# Patient Record
Sex: FEMALE | Race: WHITE | Employment: OTHER | URBAN - METROPOLITAN AREA
[De-identification: names, ages, dates, MRNs, and addresses within clinical notes are randomized per-mention and may not be internally consistent; named-entity substitution may affect disease eponyms.]

---

## 2017-10-18 ENCOUNTER — ANESTHESIA (OUTPATIENT)
Dept: ENDOSCOPY | Age: 67
End: 2017-10-18
Payer: MEDICARE

## 2017-10-18 ENCOUNTER — ANESTHESIA EVENT (OUTPATIENT)
Dept: ENDOSCOPY | Age: 67
End: 2017-10-18
Payer: MEDICARE

## 2017-10-18 ENCOUNTER — HOSPITAL ENCOUNTER (OUTPATIENT)
Age: 67
Setting detail: OUTPATIENT SURGERY
Discharge: HOME | End: 2017-10-18
Attending: SPECIALIST | Admitting: SPECIALIST
Payer: MEDICARE

## 2017-10-18 VITALS
OXYGEN SATURATION: 96 % | BODY MASS INDEX: 31.22 KG/M2 | HEART RATE: 77 BPM | WEIGHT: 194.25 LBS | RESPIRATION RATE: 15 BRPM | HEIGHT: 66 IN | SYSTOLIC BLOOD PRESSURE: 142 MMHG | DIASTOLIC BLOOD PRESSURE: 70 MMHG | TEMPERATURE: 97.8 F

## 2017-10-18 LAB
GLUCOSE BLD STRIP.AUTO-MCNC: 167 MG/DL (ref 65–100)
SERVICE CMNT-IMP: ABNORMAL

## 2017-10-18 PROCEDURE — 74011250636 HC RX REV CODE- 250/636: Performed by: SPECIALIST

## 2017-10-18 PROCEDURE — 76040000019: Performed by: SPECIALIST

## 2017-10-18 PROCEDURE — 74011250636 HC RX REV CODE- 250/636

## 2017-10-18 PROCEDURE — 77030027957 HC TBNG IRR ENDOGTR BUSS -B: Performed by: SPECIALIST

## 2017-10-18 PROCEDURE — 76060000031 HC ANESTHESIA FIRST 0.5 HR: Performed by: SPECIALIST

## 2017-10-18 PROCEDURE — 74011000250 HC RX REV CODE- 250

## 2017-10-18 PROCEDURE — 77030009426 HC FCPS BIOP ENDOSC BSC -B: Performed by: SPECIALIST

## 2017-10-18 PROCEDURE — 82962 GLUCOSE BLOOD TEST: CPT

## 2017-10-18 PROCEDURE — 77030021392 HC DEV RETRV POLYP BSC -B: Performed by: SPECIALIST

## 2017-10-18 PROCEDURE — 77030013992 HC SNR POLYP ENDOSC BSC -B: Performed by: SPECIALIST

## 2017-10-18 PROCEDURE — 88305 TISSUE EXAM BY PATHOLOGIST: CPT | Performed by: SPECIALIST

## 2017-10-18 RX ORDER — EPINEPHRINE 0.1 MG/ML
1 INJECTION INTRACARDIAC; INTRAVENOUS
Status: DISCONTINUED | OUTPATIENT
Start: 2017-10-18 | End: 2017-10-18 | Stop reason: HOSPADM

## 2017-10-18 RX ORDER — SODIUM CHLORIDE 9 MG/ML
INJECTION, SOLUTION INTRAVENOUS
Status: DISCONTINUED | OUTPATIENT
Start: 2017-10-18 | End: 2017-10-18 | Stop reason: HOSPADM

## 2017-10-18 RX ORDER — BUPROPION HYDROCHLORIDE 150 MG/1
TABLET, EXTENDED RELEASE ORAL 2 TIMES DAILY
COMMUNITY

## 2017-10-18 RX ORDER — LORAZEPAM 2 MG/ML
2 INJECTION INTRAMUSCULAR AS NEEDED
Status: DISCONTINUED | OUTPATIENT
Start: 2017-10-18 | End: 2017-10-18 | Stop reason: HOSPADM

## 2017-10-18 RX ORDER — SODIUM CHLORIDE 9 MG/ML
50 INJECTION, SOLUTION INTRAVENOUS CONTINUOUS
Status: DISCONTINUED | OUTPATIENT
Start: 2017-10-18 | End: 2017-10-18 | Stop reason: HOSPADM

## 2017-10-18 RX ORDER — CITALOPRAM 40 MG/1
40 TABLET, FILM COATED ORAL DAILY
COMMUNITY

## 2017-10-18 RX ORDER — METFORMIN HYDROCHLORIDE 1000 MG/1
1000 TABLET ORAL 2 TIMES DAILY WITH MEALS
COMMUNITY

## 2017-10-18 RX ORDER — GUAIFENESIN 100 MG/5ML
81 LIQUID (ML) ORAL DAILY
COMMUNITY
End: 2017-10-18

## 2017-10-18 RX ORDER — VALSARTAN AND HYDROCHLOROTHIAZIDE 320; 25 MG/1; MG/1
1 TABLET, FILM COATED ORAL DAILY
COMMUNITY

## 2017-10-18 RX ORDER — FENTANYL CITRATE 50 UG/ML
200 INJECTION, SOLUTION INTRAMUSCULAR; INTRAVENOUS
Status: DISCONTINUED | OUTPATIENT
Start: 2017-10-18 | End: 2017-10-18 | Stop reason: HOSPADM

## 2017-10-18 RX ORDER — NALOXONE HYDROCHLORIDE 0.4 MG/ML
0.4 INJECTION, SOLUTION INTRAMUSCULAR; INTRAVENOUS; SUBCUTANEOUS
Status: DISCONTINUED | OUTPATIENT
Start: 2017-10-18 | End: 2017-10-18 | Stop reason: HOSPADM

## 2017-10-18 RX ORDER — FLUMAZENIL 0.1 MG/ML
0.2 INJECTION INTRAVENOUS
Status: DISCONTINUED | OUTPATIENT
Start: 2017-10-18 | End: 2017-10-18 | Stop reason: HOSPADM

## 2017-10-18 RX ORDER — SODIUM CHLORIDE 0.9 % (FLUSH) 0.9 %
5-10 SYRINGE (ML) INJECTION EVERY 8 HOURS
Status: DISCONTINUED | OUTPATIENT
Start: 2017-10-18 | End: 2017-10-18 | Stop reason: HOSPADM

## 2017-10-18 RX ORDER — AMLODIPINE BESYLATE 5 MG/1
5 TABLET ORAL DAILY
COMMUNITY

## 2017-10-18 RX ORDER — GLIPIZIDE 5 MG/1
TABLET ORAL 2 TIMES DAILY
COMMUNITY

## 2017-10-18 RX ORDER — DEXTROMETHORPHAN/PSEUDOEPHED 2.5-7.5/.8
1.2 DROPS ORAL
Status: DISCONTINUED | OUTPATIENT
Start: 2017-10-18 | End: 2017-10-18 | Stop reason: HOSPADM

## 2017-10-18 RX ORDER — ATORVASTATIN CALCIUM 20 MG/1
TABLET, FILM COATED ORAL DAILY
COMMUNITY

## 2017-10-18 RX ORDER — MELOXICAM 15 MG/1
15 TABLET ORAL DAILY
COMMUNITY
End: 2017-10-18

## 2017-10-18 RX ORDER — SODIUM CHLORIDE 0.9 % (FLUSH) 0.9 %
5-10 SYRINGE (ML) INJECTION AS NEEDED
Status: DISCONTINUED | OUTPATIENT
Start: 2017-10-18 | End: 2017-10-18 | Stop reason: HOSPADM

## 2017-10-18 RX ORDER — MIDAZOLAM HYDROCHLORIDE 1 MG/ML
.25-1 INJECTION, SOLUTION INTRAMUSCULAR; INTRAVENOUS
Status: DISCONTINUED | OUTPATIENT
Start: 2017-10-18 | End: 2017-10-18 | Stop reason: HOSPADM

## 2017-10-18 RX ORDER — ATROPINE SULFATE 0.1 MG/ML
0.5 INJECTION INTRAVENOUS
Status: DISCONTINUED | OUTPATIENT
Start: 2017-10-18 | End: 2017-10-18 | Stop reason: HOSPADM

## 2017-10-18 RX ORDER — LIDOCAINE HYDROCHLORIDE 20 MG/ML
INJECTION, SOLUTION EPIDURAL; INFILTRATION; INTRACAUDAL; PERINEURAL AS NEEDED
Status: DISCONTINUED | OUTPATIENT
Start: 2017-10-18 | End: 2017-10-18 | Stop reason: HOSPADM

## 2017-10-18 RX ORDER — PROPOFOL 10 MG/ML
INJECTION, EMULSION INTRAVENOUS AS NEEDED
Status: DISCONTINUED | OUTPATIENT
Start: 2017-10-18 | End: 2017-10-18 | Stop reason: HOSPADM

## 2017-10-18 RX ADMIN — PROPOFOL 30 MG: 10 INJECTION, EMULSION INTRAVENOUS at 11:47

## 2017-10-18 RX ADMIN — LIDOCAINE HYDROCHLORIDE 40 MG: 20 INJECTION, SOLUTION EPIDURAL; INFILTRATION; INTRACAUDAL; PERINEURAL at 11:46

## 2017-10-18 RX ADMIN — PROPOFOL 50 MG: 10 INJECTION, EMULSION INTRAVENOUS at 11:49

## 2017-10-18 RX ADMIN — PROPOFOL 50 MG: 10 INJECTION, EMULSION INTRAVENOUS at 11:46

## 2017-10-18 RX ADMIN — SODIUM CHLORIDE 50 ML/HR: 900 INJECTION, SOLUTION INTRAVENOUS at 11:22

## 2017-10-18 RX ADMIN — PROPOFOL 30 MG: 10 INJECTION, EMULSION INTRAVENOUS at 11:51

## 2017-10-18 RX ADMIN — SODIUM CHLORIDE: 9 INJECTION, SOLUTION INTRAVENOUS at 11:42

## 2017-10-18 RX ADMIN — PROPOFOL 25 MG: 10 INJECTION, EMULSION INTRAVENOUS at 11:55

## 2017-10-18 RX ADMIN — PROPOFOL 40 MG: 10 INJECTION, EMULSION INTRAVENOUS at 11:48

## 2017-10-18 RX ADMIN — PROPOFOL 25 MG: 10 INJECTION, EMULSION INTRAVENOUS at 12:02

## 2017-10-18 RX ADMIN — PROPOFOL 25 MG: 10 INJECTION, EMULSION INTRAVENOUS at 11:58

## 2017-10-18 NOTE — DISCHARGE INSTRUCTIONS
Gloria Trinidad  [id number]  1950    COLON DISCHARGE INSTRUCTIONS  Discomfort:  Redness at IV site- apply warm compress to area; if redness or soreness persist- contact your physician  There may be a slight amount of blood passed from the rectum  Gaseous discomfort- walking, belching will help relieve any discomfort  You may not operate a vehicle for 12 hours  You may not engage in an occupation involving machinery or appliances for rest of today  You may not drink alcoholic beverages for at least 12 hours  Avoid making any critical decisions for at least 24 hour  DIET:   Regular diet. - however -  remember your colon is empty and a heavy meal will produce gas. Avoid these foods:  vegetables, fried / greasy foods, carbonated drinks for today. ACTIVITY:  You may resume your normal daily activities it is recommended that you spend the remainder of the day resting -  avoid any strenuous activity. CALL M.D. ANY SIGN OF:  Increasing pain, nausea, vomiting  Abdominal distension (swelling)  New increased bleeding (oral or rectal)  Fever (chills)  Pain in chest area  Bloody discharge from nose or mouth  Shortness of breath    You may not  take any Advil, Aspirin, Ibuprofen, Motrin, Aleve, Goodys, or any similar pain or arthritis products for 3 days, ONLY  Tylenol as needed for pain.       Follow-up Instructions:   Call Dr. Durga Olson  Results of procedure / biopsy in 10-14days   Office telephone for problems or questions [phone]    Recommendation for next colonscopy in 3 years  If < 10 years, reason:  above average risk patient

## 2017-10-18 NOTE — PROCEDURES
Colonoscopy Procedure Note    Indications:   Family history of coloretal cancer (screening only), Screening colonoscopy  Referring Physician: MAMIE Chiang   Anesthesia/Sedation:MAC  Endoscopist:  Dr. Cherie Gimenez  Assistant:  Endoscopy Technician-1: Augusto Kilgore  Endoscopy RN-1: Mihaela Barkley RN    Preoperative diagnosis: FAMILY HISTORY OF MALIGNANT NEOPLASM OF GASTROINTESTINAL TRACT   POSITIVE COLOGUARD     Postoperative diagnosis: 1.- Scattered Diverticulosis  2.- Colon Polyps      Procedure in Detail:  Informed consent was obtained for the procedure, including sedation. Risks of perforation, hemorrhage, adverse drug reaction, and aspiration were discussed. The patient was placed in the left lateral decubitus position. Based on the pre-procedure assessment, including review of the patient's medical history, medications, allergies, and review of systems, she had been deemed to be an appropriate candidate for moderate sedation; she was therefore sedated with the medications listed above. The patient was monitored continuously with ECG tracing, pulse oximetry, blood pressure monitoring, and direct observations. A rectal examination was performed. The [id number] was inserted into the rectum and advanced under direct vision to the terminal ileum proximally 10 cm. The quality of the colonic preparation was excellent. A careful inspection was made as the colonoscope was withdrawn, including a retroflexed view of the rectum; findings and interventions are described below. Findings:Scattered Diverticulosis   Rectum: normal  Sigmoid: normal  Descending Colon: normal  Transverse Colon: 2 - 3 mm proximal polyps removed with cold snare  Ascending Colon: normal  Cecum: normal  Terminal Ileum: normal proximally x 10 cm    Specimens:     see above    EBL: None    Complications: None; patient tolerated the procedure well. Recommendations:     - Await pathology.     - If adenoma is present, repeat colonoscopy in 3 years.      - If < 10 years, reason: above average risk patient    Signed By: Micheal Fournier MD                        October 18, 2017

## 2017-10-18 NOTE — PROGRESS NOTES

## 2017-10-18 NOTE — ANESTHESIA PREPROCEDURE EVALUATION
Anesthetic History               Review of Systems / Medical History  Patient summary reviewed, nursing notes reviewed and pertinent labs reviewed    Pulmonary                   Neuro/Psych              Cardiovascular    Hypertension              Exercise tolerance: >4 METS     GI/Hepatic/Renal               Comments: Positive guiac Endo/Other    Diabetes: type 2         Other Findings              Physical Exam    Airway  Mallampati: II  TM Distance: > 6 cm  Neck ROM: normal range of motion   Mouth opening: Normal     Cardiovascular    Rhythm: regular  Rate: normal         Dental  No notable dental hx       Pulmonary  Breath sounds clear to auscultation               Abdominal         Other Findings            Anesthetic Plan    ASA: 2  Anesthesia type: MAC          Induction: Intravenous  Anesthetic plan and risks discussed with: Patient

## 2017-10-18 NOTE — ANESTHESIA POSTPROCEDURE EVALUATION
Post-Anesthesia Evaluation and Assessment    Patient: Audrey Hunter MRN: [id number]  SSN: [id number]    YOB: 1950  Age: 69 y.o. Sex: female       Cardiovascular Function/Vital Signs  Visit Vitals    /70    Pulse 77    Temp 36.6 °C (97.8 °F)    Resp 15    Ht 5' 6\" (1.676 m)    Wt 88.1 kg (194 lb 4 oz)    SpO2 96%    BMI 31.35 kg/m2       Patient is status post MAC anesthesia for Procedure(s):  COLONOSCOPY  ENDOSCOPIC POLYPECTOMY. Nausea/Vomiting: None    Postoperative hydration reviewed and adequate. Pain:  Pain Scale 1: Numeric (0 - 10) (10/18/17 1057)  Pain Intensity 1: 5 (10/18/17 1057)   Managed    Neurological Status: At baseline    Mental Status and Level of Consciousness: Arousable    Pulmonary Status:   O2 Device: CO2 nasal cannula (10/18/17 1210)   Adequate oxygenation and airway patent    Complications related to anesthesia: None    Post-anesthesia assessment completed.  No concerns    Signed By: Eliceo Sexton MD     October 18, 2017

## 2017-10-18 NOTE — ROUTINE PROCESS
[facility]  1950  [id number]    Situation:  Verbal report received from: [place]  Procedure: Procedure(s):  COLONOSCOPY  ENDOSCOPIC POLYPECTOMY    Background:    Preoperative diagnosis: FAMILY HISTORY OF MALIGNANT NEOPLASM OF GASTROINTESTINAL TRACT   POSITIVE COLOGUARD   Postoperative diagnosis: 1.- Scattered Diverticulosis  2.- Colon Polyps    :  Dr. Sulema Hi  Assistant(s): Endoscopy Technician-1: Dory Sheldon  Endoscopy RN-1: Viral Patel RN    Specimens:   ID Type Source Tests Collected by Time Destination   1 : Proximal Transverse Colon Preservative   Sami Howard MD 10/18/2017 1204 Pathology     H. Pylori  no    Assessment:  Intra-procedure medications   Anesthesia gave intra-procedure sedation and medications, see anesthesia flow sheet yes    Intravenous fluids: NS@ KVO     Vital signs stable     Abdominal assessment: round and soft     Recommendation:  Discharge patient per MD order.   Family or Friend   Permission to share finding with family or friend yes

## 2017-10-18 NOTE — IP AVS SNAPSHOT
[address] 
[phone] Patient: Robert Lee MRN: [id number] Located within [facility]:9/7/5200 You are allergic to the following Allergen Reactions Ace Inhibitors Cough Recent Documentation Height Weight BMI OB Status Smoking Status 1.676 m 88.1 kg 31.35 kg/m2 Postmenopausal Current Some Day Smoker Emergency Contacts Name Discharge Info Relation Home Work [employer] CAREGIVER [3] Daughter [21]   [phone] About your hospitalization You were admitted on:  October 18, 2017 You last received care in the:  [facility] ENDOSCOPY You were discharged on:  October 18, 2017 Unit phone number:  [phone] Why you were hospitalized Your primary diagnosis was:  Not on File Providers Seen During Your Hospitalizations Provider Role Specialty Primary office phone Cathi Greenfield MD Attending Provider Gastroenterology [phone] Your Primary Care Physician (PCP) Primary Care Physician Office Phone Office Fax Amanda Martinez [phone] Follow-up Information None Current Discharge Medication List  
  
CONTINUE these medications which have NOT CHANGED Dose & Instructions Dispensing Information Comments Morning Noon Evening Bedtime  
 amLODIPine 5 mg tablet Commonly known as:  Voncile Wellman Your last dose was: Your next dose is:    
   
   
 Dose:  5 mg Take 5 mg by mouth daily. Refills:  0  
     
   
   
   
  
 atorvastatin 20 mg tablet Commonly known as:  LIPITOR Your last dose was: Your next dose is: Take  by mouth daily. Refills:  0  
     
   
   
   
  
 buPROPion  mg SR tablet Commonly known as:  Evlyn Sill Your last dose was: Your next dose is: Take  by mouth two (2) times a day. Refills:  0 citalopram 40 mg tablet Commonly known as:  Iva Mutters Your last dose was: Your next dose is:    
   
   
 Dose:  40 mg Take 40 mg by mouth daily. Refills:  0  
     
   
   
   
  
 glipiZIDE 5 mg tablet Commonly known as:  Janith Ket Your last dose was: Your next dose is: Take  by mouth two (2) times a day. Refills:  0 JANUVIA 100 mg tablet Generic drug:  SITagliptin Your last dose was: Your next dose is:    
   
   
 Dose:  100 mg Take 100 mg by mouth daily. Refills:  0  
     
   
   
   
  
 metFORMIN 1,000 mg tablet Commonly known as:  GLUCOPHAGE Your last dose was: Your next dose is:    
   
   
 Dose:  1000 mg Take 1,000 mg by mouth two (2) times daily (with meals). Refills:  0  
     
   
   
   
  
 valsartan-hydroCHLOROthiazide 320-25 mg per tablet Commonly known as:  DIOVAN-HCT Your last dose was: Your next dose is:    
   
   
 Dose:  1 Tab Take 1 Tab by mouth daily. Refills:  0 STOP taking these medications   
 aspirin 81 mg chewable tablet  
   
  
 meloxicam 15 mg tablet Commonly known as:  MOBIC Discharge Instructions Marco Jason [id number] 
1950 COLON DISCHARGE INSTRUCTIONS Discomfort: 
Redness at IV site- apply warm compress to area; if redness or soreness persist- contact your physician There may be a slight amount of blood passed from the rectum Gaseous discomfort- walking, belching will help relieve any discomfort You may not operate a vehicle for 12 hours You may not engage in an occupation involving machinery or appliances for rest of today You may not drink alcoholic beverages for at least 12 hours Avoid making any critical decisions for at least 24 hour DIET: 
 Regular diet.  however -  remember your colon is empty and a heavy meal will produce gas. Avoid these foods:  vegetables, fried / greasy foods, carbonated drinks for today. ACTIVITY: 
You may resume your normal daily activities it is recommended that you spend the remainder of the day resting -  avoid any strenuous activity. CALL M.D. ANY SIGN OF: Increasing pain, nausea, vomiting Abdominal distension (swelling) New increased bleeding (oral or rectal) Fever (chills) Pain in chest area Bloody discharge from nose or mouth Shortness of breath You may not  take any Advil, Aspirin, Ibuprofen, Motrin, Aleve, Goodys, or any similar pain or arthritis products for 3 days, ONLY  Tylenol as needed for pain. Follow-up Instructions: 
 Call Dr. Memo Barnett Results of procedure / biopsy in 10-14days Office telephone for problems or questions [phone] Recommendation for next colonscopy in 3 years If < 10 years, reason:  above average risk patient Discharge Orders None Introducing [facility]! Pauline Fuentes introduces Karma patient portal. Now you can access parts of your medical record, email your doctor's office, and request medication refills online. 1. In your internet browser, go to [email or web address] 2. Click on the First Time User? Click Here link in the Sign In box. You will see the New Member Sign Up page. 3. Enter your Karma Access Code exactly as it appears below. You will not need to use this code after youve completed the sign-up process. If you do not sign up before the expiration date, you must request a new code. · Karma Access Code: [email or web address] Expires: 1/16/2018 12:22 PM 
 
4. Enter the last four digits of your Social Security Number (xxxx) and Date of Birth (mm/dd/yyyy) as indicated and click Submit. You will be taken to the next sign-up page. 5. Create a Karma ID. This will be your Karma login ID and cannot be changed, so think of one that is secure and easy to remember. 6. Create a Wee Web password. You can change your password at any time. 7. Enter your Password Reset Question and Answer. This can be used at a later time if you forget your password. 8. Enter your e-mail address. You will receive e-mail notification when new information is available in [address] 9. Click Sign Up. You can now view and download portions of your medical record. 10. Click the Download Summary menu link to download a portable copy of your medical information. If you have questions, please visit the Frequently Asked Questions section of the Wee Web website. Remember, Wee Web is NOT to be used for urgent needs. For medical emergencies, dial 911. Now available from your iPhone and Android! General Information Please provide this summary of care documentation to your next provider. Patient Signature:  ____________________________________________________________ Date:  ____________________________________________________________  
  
[facility] Provider Signature:  ____________________________________________________________ Date:  ____________________________________________________________

## 2020-07-12 ENCOUNTER — HOSPITAL ENCOUNTER (OUTPATIENT)
Dept: PREADMISSION TESTING | Age: 70
Discharge: HOME | End: 2020-07-12
Payer: MEDICARE

## 2020-07-12 PROCEDURE — 87635 SARS-COV-2 COVID-19 AMP PRB: CPT

## 2020-07-13 LAB — SARS-COV-2, COV2NT: NOT DETECTED

## 2020-07-15 ENCOUNTER — HOSPITAL ENCOUNTER (OUTPATIENT)
Age: 70
Setting detail: OUTPATIENT SURGERY
Discharge: HOME | End: 2020-07-15
Attending: SPECIALIST | Admitting: SPECIALIST
Payer: MEDICARE

## 2020-07-15 ENCOUNTER — ANESTHESIA (OUTPATIENT)
Dept: ENDOSCOPY | Age: 70
End: 2020-07-15
Payer: MEDICARE

## 2020-07-15 ENCOUNTER — ANESTHESIA EVENT (OUTPATIENT)
Dept: ENDOSCOPY | Age: 70
End: 2020-07-15
Payer: MEDICARE

## 2020-07-15 VITALS
HEIGHT: 66 IN | BODY MASS INDEX: 28.77 KG/M2 | WEIGHT: 179 LBS | DIASTOLIC BLOOD PRESSURE: 60 MMHG | OXYGEN SATURATION: 96 % | SYSTOLIC BLOOD PRESSURE: 142 MMHG | TEMPERATURE: 98.6 F | HEART RATE: 68 BPM | RESPIRATION RATE: 16 BRPM

## 2020-07-15 PROCEDURE — 76040000007: Performed by: SPECIALIST

## 2020-07-15 PROCEDURE — 74011250636 HC RX REV CODE- 250/636: Performed by: NURSE ANESTHETIST, CERTIFIED REGISTERED

## 2020-07-15 PROCEDURE — 77030013992 HC SNR POLYP ENDOSC BSC -B: Performed by: SPECIALIST

## 2020-07-15 PROCEDURE — 76060000032 HC ANESTHESIA 0.5 TO 1 HR: Performed by: SPECIALIST

## 2020-07-15 PROCEDURE — 88305 TISSUE EXAM BY PATHOLOGIST: CPT

## 2020-07-15 PROCEDURE — 74011000250 HC RX REV CODE- 250: Performed by: NURSE ANESTHETIST, CERTIFIED REGISTERED

## 2020-07-15 PROCEDURE — 74011250637 HC RX REV CODE- 250/637: Performed by: SPECIALIST

## 2020-07-15 PROCEDURE — 77030021593 HC FCPS BIOP ENDOSC BSC -A: Performed by: SPECIALIST

## 2020-07-15 PROCEDURE — 74011250636 HC RX REV CODE- 250/636: Performed by: SPECIALIST

## 2020-07-15 RX ORDER — LIDOCAINE HYDROCHLORIDE 20 MG/ML
INJECTION, SOLUTION EPIDURAL; INFILTRATION; INTRACAUDAL; PERINEURAL AS NEEDED
Status: DISCONTINUED | OUTPATIENT
Start: 2020-07-15 | End: 2020-07-15 | Stop reason: HOSPADM

## 2020-07-15 RX ORDER — MIDAZOLAM HYDROCHLORIDE 1 MG/ML
.25-5 INJECTION, SOLUTION INTRAMUSCULAR; INTRAVENOUS
Status: DISCONTINUED | OUTPATIENT
Start: 2020-07-15 | End: 2020-07-15 | Stop reason: HOSPADM

## 2020-07-15 RX ORDER — SODIUM CHLORIDE 0.9 % (FLUSH) 0.9 %
5-40 SYRINGE (ML) INJECTION AS NEEDED
Status: DISCONTINUED | OUTPATIENT
Start: 2020-07-15 | End: 2020-07-15 | Stop reason: HOSPADM

## 2020-07-15 RX ORDER — ATROPINE SULFATE 0.1 MG/ML
0.5 INJECTION INTRAVENOUS
Status: DISCONTINUED | OUTPATIENT
Start: 2020-07-15 | End: 2020-07-15 | Stop reason: HOSPADM

## 2020-07-15 RX ORDER — EPINEPHRINE 0.1 MG/ML
1 INJECTION INTRACARDIAC; INTRAVENOUS
Status: DISCONTINUED | OUTPATIENT
Start: 2020-07-15 | End: 2020-07-15 | Stop reason: HOSPADM

## 2020-07-15 RX ORDER — FENTANYL CITRATE 50 UG/ML
12.5-5 INJECTION, SOLUTION INTRAMUSCULAR; INTRAVENOUS
Status: DISCONTINUED | OUTPATIENT
Start: 2020-07-15 | End: 2020-07-15 | Stop reason: HOSPADM

## 2020-07-15 RX ORDER — FLUMAZENIL 0.1 MG/ML
0.2 INJECTION INTRAVENOUS
Status: DISCONTINUED | OUTPATIENT
Start: 2020-07-15 | End: 2020-07-15 | Stop reason: HOSPADM

## 2020-07-15 RX ORDER — DEXTROMETHORPHAN/PSEUDOEPHED 2.5-7.5/.8
1.2 DROPS ORAL
Status: DISCONTINUED | OUTPATIENT
Start: 2020-07-15 | End: 2020-07-15 | Stop reason: HOSPADM

## 2020-07-15 RX ORDER — SODIUM CHLORIDE 9 MG/ML
50 INJECTION, SOLUTION INTRAVENOUS CONTINUOUS
Status: DISCONTINUED | OUTPATIENT
Start: 2020-07-15 | End: 2020-07-15 | Stop reason: HOSPADM

## 2020-07-15 RX ORDER — SODIUM CHLORIDE 0.9 % (FLUSH) 0.9 %
5-40 SYRINGE (ML) INJECTION EVERY 8 HOURS
Status: DISCONTINUED | OUTPATIENT
Start: 2020-07-15 | End: 2020-07-15 | Stop reason: HOSPADM

## 2020-07-15 RX ORDER — NALOXONE HYDROCHLORIDE 0.4 MG/ML
0.4 INJECTION, SOLUTION INTRAMUSCULAR; INTRAVENOUS; SUBCUTANEOUS
Status: DISCONTINUED | OUTPATIENT
Start: 2020-07-15 | End: 2020-07-15 | Stop reason: HOSPADM

## 2020-07-15 RX ORDER — PROPOFOL 10 MG/ML
INJECTION, EMULSION INTRAVENOUS AS NEEDED
Status: DISCONTINUED | OUTPATIENT
Start: 2020-07-15 | End: 2020-07-15 | Stop reason: HOSPADM

## 2020-07-15 RX ORDER — SODIUM CHLORIDE 9 MG/ML
INJECTION, SOLUTION INTRAVENOUS
Status: DISCONTINUED | OUTPATIENT
Start: 2020-07-15 | End: 2020-07-15 | Stop reason: HOSPADM

## 2020-07-15 RX ADMIN — PROPOFOL 40 MG: 10 INJECTION, EMULSION INTRAVENOUS at 09:23

## 2020-07-15 RX ADMIN — PROPOFOL 20 MG: 10 INJECTION, EMULSION INTRAVENOUS at 09:03

## 2020-07-15 RX ADMIN — PROPOFOL 20 MG: 10 INJECTION, EMULSION INTRAVENOUS at 09:08

## 2020-07-15 RX ADMIN — PROPOFOL 30 MG: 10 INJECTION, EMULSION INTRAVENOUS at 09:01

## 2020-07-15 RX ADMIN — PROPOFOL 30 MG: 10 INJECTION, EMULSION INTRAVENOUS at 09:11

## 2020-07-15 RX ADMIN — PROPOFOL 20 MG: 10 INJECTION, EMULSION INTRAVENOUS at 09:21

## 2020-07-15 RX ADMIN — PROPOFOL 20 MG: 10 INJECTION, EMULSION INTRAVENOUS at 09:26

## 2020-07-15 RX ADMIN — SODIUM CHLORIDE: 900 INJECTION, SOLUTION INTRAVENOUS at 08:44

## 2020-07-15 RX ADMIN — PROPOFOL 20 MG: 10 INJECTION, EMULSION INTRAVENOUS at 09:13

## 2020-07-15 RX ADMIN — LIDOCAINE HYDROCHLORIDE 40 MG: 20 INJECTION, SOLUTION EPIDURAL; INFILTRATION; INTRACAUDAL; PERINEURAL at 08:58

## 2020-07-15 RX ADMIN — PROPOFOL 50 MG: 10 INJECTION, EMULSION INTRAVENOUS at 08:59

## 2020-07-15 RX ADMIN — PROPOFOL 30 MG: 10 INJECTION, EMULSION INTRAVENOUS at 09:15

## 2020-07-15 RX ADMIN — PROPOFOL 50 MG: 10 INJECTION, EMULSION INTRAVENOUS at 08:58

## 2020-07-15 NOTE — ANESTHESIA PREPROCEDURE EVALUATION
Relevant Problems   No relevant active problems       Anesthetic History   No history of anesthetic complications            Review of Systems / Medical History  Patient summary reviewed, nursing notes reviewed and pertinent labs reviewed    Pulmonary  Within defined limits                 Neuro/Psych   Within defined limits           Cardiovascular  Within defined limits  Hypertension                   GI/Hepatic/Renal  Within defined limits              Endo/Other  Within defined limits  Diabetes         Other Findings              Physical Exam    Airway  Mallampati: II  TM Distance: > 6 cm  Neck ROM: normal range of motion   Mouth opening: Normal     Cardiovascular  Regular rate and rhythm,  S1 and S2 normal,  no murmur, click, rub, or gallop             Dental  No notable dental hx       Pulmonary  Breath sounds clear to auscultation               Abdominal  GI exam deferred       Other Findings            Anesthetic Plan    ASA: 2  Anesthesia type: MAC            Anesthetic plan and risks discussed with: Patient

## 2020-07-15 NOTE — PERIOP NOTES
TRANSFER - IN REPORT:    Verbal report received from MP on Tressie Oppenheim  being received from for routine post - op      Report consisted of patients Situation, Background, Assessment and   Recommendations(SBAR). Information from the following report(s) Procedure Summary was reviewed with the receiving nurse. Opportunity for questions and clarification was provided. Assessment completed upon patients arrival to unit and care assumed. 0945 ; DC instructions provided to pt's daughter via telepone. 1000: Pt assisted in dressing. Pt given a new mask. Pt verbalizes dc instructions. 1010: Pt taken via wc to daughter's car for DC.

## 2020-07-15 NOTE — ANESTHESIA POSTPROCEDURE EVALUATION
Procedure(s):  COLONOSCOPY :-  ENDOSCOPIC POLYPECTOMY. MAC    <BSHSIANPOST>    INITIAL Post-op Vital signs:   Vitals Value Taken Time   BP 0/0 7/15/2020  9:37 AM   Temp     Pulse 69 7/15/2020  9:37 AM   Resp 18 7/15/2020  9:37 AM   SpO2 94 % 7/15/2020  9:37 AM   Vitals shown include unvalidated device data.

## 2020-07-15 NOTE — DISCHARGE INSTRUCTIONS
[facility]  [id number]  1950    COLON DISCHARGE INSTRUCTIONS  Discomfort:  Redness at IV site- apply warm compress to area; if redness or soreness persist- contact your physician  There may be a slight amount of blood passed from the rectum  Gaseous discomfort- walking, belching will help relieve any discomfort  You may not operate a vehicle for 12 hours  You may not engage in an occupation involving machinery or appliances for rest of today  You may not drink alcoholic beverages for at least 12 hours  Avoid making any critical decisions for at least 24 hour  DIET:   Regular diet. - however -  remember your colon is empty and a heavy meal will produce gas. Avoid these foods:  vegetables, fried / greasy foods, carbonated drinks for today. ACTIVITY:  You may resume your normal daily activities it is recommended that you spend the remainder of the day resting -  avoid any strenuous activity. CALL M.D. ANY SIGN OF:  Increasing pain, nausea, vomiting  Abdominal distension (swelling)  New increased bleeding (oral or rectal)  Fever (chills)  Pain in chest area  Bloody discharge from nose or mouth  Shortness of breath    You may not  take any Advil, Aspirin, Ibuprofen, Motrin, Aleve, Goodys, or any similar pain or arthritis products for 3 days, ONLY  Tylenol as needed for pain. Follow-up Instructions:   Call Dr. Shayy Greenberg  Results of procedure / biopsy in 10-14days   Office telephone for problems or questions [phone]    - Await pathology. - Repeat colonoscopy in 3 years. - If adenoma is present, repeat colonoscopy in 3 years.      - If < 10 years, reason: above average risk patient

## 2020-07-15 NOTE — PERIOP NOTES

## 2020-07-15 NOTE — PROCEDURES
Colonoscopy Procedure Note    Indications:   Family history of coloretal cancer (screening only) in 34 yo daughter, Personal history of colon polyps (screening only), altered bowel habits with rectal bleeding now resolve x weeks  Referring Physician: Leann Devi PA-C   Anesthesia/Sedation:MAC  Endoscopist:  Dr. Keara Pollard  Assistant:  Endoscopy Technician-1: Chrissy Avila IV  Endoscopy RN-1: Joseph Casas  Surgical Assistant: None  Implants: None    Preoperative diagnosis: FAMILY HX OF MALIGNANT NEOPLASM OF GASTROINTESTINAL TRACT  POSITIVE COLOGUARD    Postoperative diagnosis: 1. Ascending Colon Polyp x 4  2. Sigmoid Colon Polyp  3. Internal Hemorrhoids  4. Mild Diverticulosis      Procedure in Detail:  Informed consent was obtained for the procedure, including sedation. Risks of perforation, hemorrhage, adverse drug reaction, and aspiration were discussed. The patient was placed in the left lateral decubitus position. Based on the pre-procedure assessment, including review of the patient's medical history, medications, allergies, and review of systems, she had been deemed to be an appropriate candidate for moderate sedation; she was therefore sedated with the medications listed above. The patient was monitored continuously with ECG tracing, pulse oximetry, blood pressure monitoring, and direct observations. A rectal examination was performed. The [id number] was inserted into the rectum and advanced under direct vision to the terminal ileum. The quality of the colonic preparation was excellent. A careful inspection was made as the colonoscope was withdrawn, including a retroflexed view of the rectum; findings and interventions are described below. Findings:   Rectum: Grade 1 internal hemorrhoid(s); Sigmoid: mild diverticulosis;   Mm polyp removed with cold forceps  Descending Colon: normal  Transverse Colon: normal  Ascending Colon: 4 polyps ranging in size from 2-4 mm the largest removed with cold snare the rest with cold forceps  Cecum: normal  Terminal Ileum: normal    Specimens:     see above    EBL: None    Complications: None; patient tolerated the procedure well. Recommendations:     - Await pathology. - Repeat colonoscopy in 3 years. - If adenoma is present, repeat colonoscopy in 3 years.      - If < 10 years, reason: above average risk patient    Signed By: Carlton Culver MD                        July 15, 2020

## 2020-07-15 NOTE — H&P
Pre-endoscopy H and P for Colonoscopy    The patient was seen and examined. Date of last colonoscopy: 2917, Polyps  Yes      The airway was assessed and documented. The problem list, past medical history, and medications were reviewed. There is no problem list on file for this patient. Social History     Socioeconomic History    Marital status:      Spouse name: Not on file    Number of children: Not on file    Years of education: Not on file    Highest education level: Not on file   Occupational History    Not on file   Social Needs    Financial resource strain: Not on file    Food insecurity     Worry: Not on file     Inability: Not on file    Transportation needs     Medical: Not on file     Non-medical: Not on file   Tobacco Use    Smoking status: Current Some Day Smoker    Smokeless tobacco: Never Used   Substance and Sexual Activity    Alcohol use: Yes     Comment: occasionally    Drug use: Not on file    Sexual activity: Not on file   Lifestyle    Physical activity     Days per week: Not on file     Minutes per session: Not on file    Stress: Not on file   Relationships    Social connections     Talks on phone: Not on file     Gets together: Not on file     Attends Zoroastrian service: Not on file     Active member of club or organization: Not on file     Attends meetings of clubs or organizations: Not on file     Relationship status: Not on file    Intimate partner violence     Fear of current or ex partner: Not on file     Emotionally abused: Not on file     Physically abused: Not on file     Forced sexual activity: Not on file   Other Topics Concern    Not on file   Social History Narrative    Not on file     Past Medical History:   Diagnosis Date    Diabetes ([facility] [address]     Hypertension      The patient has a family history of colon ca    Prior to Admission Medications   Prescriptions Last Dose Informant Patient Reported? Taking?    SITagliptin (JANUVIA) 100 mg tablet   Yes No Sig: Take 100 mg by mouth daily. amLODIPine (NORVASC) 5 mg tablet   Yes No   Sig: Take 5 mg by mouth daily. atorvastatin (LIPITOR) 20 mg tablet   Yes No   Sig: Take  by mouth daily. buPROPion SR (WELLBUTRIN SR) 150 mg SR tablet   Yes No   Sig: Take  by mouth two (2) times a day. citalopram (CELEXA) 40 mg tablet   Yes No   Sig: Take 40 mg by mouth daily. glipiZIDE (GLUCOTROL) 5 mg tablet   Yes No   Sig: Take  by mouth two (2) times a day. metFORMIN (GLUCOPHAGE) 1,000 mg tablet   Yes No   Sig: Take 1,000 mg by mouth two (2) times daily (with meals). valsartan-hydroCHLOROthiazide (DIOVAN-HCT) 320-25 mg per tablet   Yes No   Sig: Take 1 Tab by mouth daily. Facility-Administered Medications: None         The review of systems is:  negative for shortness of breath or chest pain      The heart, lungs and mental status were satisfactory for the administration of MAC sedation and for the procedure. Mallampati score: See Anesthesia. I discussed with the patient the objectives, risks, consequences and alternatives to the procedure. Plan: Endoscopic procedure with MAC sedation.     Juancho Foster MD  7/15/2020  8:35 AM

## 2024-06-04 ENCOUNTER — ANESTHESIA EVENT (OUTPATIENT)
Facility: HOSPITAL | Age: 74
End: 2024-06-04
Payer: MEDICARE

## 2024-06-04 ENCOUNTER — ANESTHESIA (OUTPATIENT)
Facility: HOSPITAL | Age: 74
End: 2024-06-04
Payer: MEDICARE

## 2024-06-04 ENCOUNTER — HOSPITAL ENCOUNTER (OUTPATIENT)
Facility: HOSPITAL | Age: 74
Setting detail: OUTPATIENT SURGERY
Discharge: HOME | End: 2024-06-04
Attending: INTERNAL MEDICINE | Admitting: INTERNAL MEDICINE
Payer: MEDICARE

## 2024-06-04 VITALS
HEIGHT: 66 IN | BODY MASS INDEX: 27.32 KG/M2 | RESPIRATION RATE: 19 BRPM | DIASTOLIC BLOOD PRESSURE: 62 MMHG | TEMPERATURE: 97.5 F | HEART RATE: 71 BPM | WEIGHT: 170 LBS | OXYGEN SATURATION: 98 % | SYSTOLIC BLOOD PRESSURE: 123 MMHG

## 2024-06-04 PROCEDURE — 6360000002 HC RX W HCPCS

## 2024-06-04 PROCEDURE — 3600007512: Performed by: INTERNAL MEDICINE

## 2024-06-04 PROCEDURE — 3600007502: Performed by: INTERNAL MEDICINE

## 2024-06-04 PROCEDURE — 7100000011 HC PHASE II RECOVERY - ADDTL 15 MIN: Performed by: INTERNAL MEDICINE

## 2024-06-04 PROCEDURE — 2580000003 HC RX 258: Performed by: INTERNAL MEDICINE

## 2024-06-04 PROCEDURE — 2500000003 HC RX 250 WO HCPCS

## 2024-06-04 PROCEDURE — 2709999900 HC NON-CHARGEABLE SUPPLY: Performed by: INTERNAL MEDICINE

## 2024-06-04 PROCEDURE — 7100000010 HC PHASE II RECOVERY - FIRST 15 MIN: Performed by: INTERNAL MEDICINE

## 2024-06-04 PROCEDURE — 88305 TISSUE EXAM BY PATHOLOGIST: CPT

## 2024-06-04 PROCEDURE — 3700000001 HC ADD 15 MINUTES (ANESTHESIA): Performed by: INTERNAL MEDICINE

## 2024-06-04 PROCEDURE — 3700000000 HC ANESTHESIA ATTENDED CARE: Performed by: INTERNAL MEDICINE

## 2024-06-04 RX ORDER — SODIUM CHLORIDE 9 MG/ML
25 INJECTION, SOLUTION INTRAVENOUS PRN
Status: DISCONTINUED | OUTPATIENT
Start: 2024-06-04 | End: 2024-06-04 | Stop reason: HOSPADM

## 2024-06-04 RX ORDER — ASPIRIN 81 MG/1
81 TABLET, CHEWABLE ORAL DAILY
COMMUNITY

## 2024-06-04 RX ORDER — LIDOCAINE HYDROCHLORIDE 20 MG/ML
INJECTION, SOLUTION EPIDURAL; INFILTRATION; INTRACAUDAL; PERINEURAL PRN
Status: DISCONTINUED | OUTPATIENT
Start: 2024-06-04 | End: 2024-06-04 | Stop reason: SDUPTHER

## 2024-06-04 RX ORDER — SODIUM CHLORIDE 0.9 % (FLUSH) 0.9 %
5-40 SYRINGE (ML) INJECTION PRN
Status: DISCONTINUED | OUTPATIENT
Start: 2024-06-04 | End: 2024-06-04 | Stop reason: HOSPADM

## 2024-06-04 RX ORDER — BUPROPION HYDROCHLORIDE 150 MG/1
150 TABLET ORAL EVERY MORNING
COMMUNITY
Start: 2017-12-19

## 2024-06-04 RX ORDER — LOSARTAN POTASSIUM 50 MG/1
1 TABLET ORAL DAILY
COMMUNITY

## 2024-06-04 RX ORDER — AMLODIPINE BESYLATE 5 MG/1
TABLET ORAL EVERY 24 HOURS
COMMUNITY

## 2024-06-04 RX ORDER — SODIUM CHLORIDE 0.9 % (FLUSH) 0.9 %
5-40 SYRINGE (ML) INJECTION EVERY 12 HOURS SCHEDULED
Status: DISCONTINUED | OUTPATIENT
Start: 2024-06-04 | End: 2024-06-04 | Stop reason: HOSPADM

## 2024-06-04 RX ORDER — SODIUM CHLORIDE 9 MG/ML
INJECTION, SOLUTION INTRAVENOUS CONTINUOUS
Status: DISCONTINUED | OUTPATIENT
Start: 2024-06-04 | End: 2024-06-04 | Stop reason: HOSPADM

## 2024-06-04 RX ORDER — CITALOPRAM 40 MG/1
0.5 TABLET ORAL DAILY
COMMUNITY

## 2024-06-04 RX ORDER — ATORVASTATIN CALCIUM 20 MG/1
20 TABLET, FILM COATED ORAL EVERY 24 HOURS
COMMUNITY

## 2024-06-04 RX ADMIN — PROPOFOL 30 MG: 10 INJECTION, EMULSION INTRAVENOUS at 11:00

## 2024-06-04 RX ADMIN — LIDOCAINE HYDROCHLORIDE 40 MG: 20 INJECTION, SOLUTION EPIDURAL; INFILTRATION; INTRACAUDAL; PERINEURAL at 10:54

## 2024-06-04 RX ADMIN — PROPOFOL 30 MG: 10 INJECTION, EMULSION INTRAVENOUS at 10:57

## 2024-06-04 RX ADMIN — PROPOFOL 40 MG: 10 INJECTION, EMULSION INTRAVENOUS at 10:58

## 2024-06-04 RX ADMIN — PROPOFOL 20 MG: 10 INJECTION, EMULSION INTRAVENOUS at 11:03

## 2024-06-04 RX ADMIN — PROPOFOL 30 MG: 10 INJECTION, EMULSION INTRAVENOUS at 10:56

## 2024-06-04 RX ADMIN — PROPOFOL 20 MG: 10 INJECTION, EMULSION INTRAVENOUS at 11:05

## 2024-06-04 RX ADMIN — SODIUM CHLORIDE: 9 INJECTION, SOLUTION INTRAVENOUS at 10:51

## 2024-06-04 RX ADMIN — PROPOFOL 20 MG: 10 INJECTION, EMULSION INTRAVENOUS at 10:55

## 2024-06-04 RX ADMIN — PROPOFOL 50 MG: 10 INJECTION, EMULSION INTRAVENOUS at 10:54

## 2024-06-04 ASSESSMENT — PAIN - FUNCTIONAL ASSESSMENT
PAIN_FUNCTIONAL_ASSESSMENT: NONE - DENIES PAIN
PAIN_FUNCTIONAL_ASSESSMENT: NONE - DENIES PAIN

## 2024-06-04 NOTE — H&P
74 y.o. female presents for open access colonoscopy for surveillance given a personal history of colon polyps.  Additional H&P data will be attached on the day of procedure.    Kyleigh Scott Jr, MD

## 2024-06-04 NOTE — INTERVAL H&P NOTE
Pre-Endoscopy H&P Update  Chief complaint/HPI/ROS:  The indication for the procedure, the patient's history and the patient's current medications are reviewed prior to the procedure and that data is reported on the H&P to which this document is attached.  Any significant complaints with regard to organ systems will be noted, and if not mentioned then a review of systems is not contributory.  Past Medical History:   Diagnosis Date    Diabetes (HCC)     Hypertension       Past Surgical History:   Procedure Laterality Date    COLONOSCOPY N/A 10/18/2017    COLONOSCOPY performed by Yo Shepard MD at [facility] ENDOSCOPY    COLONOSCOPY Left 7/15/2020    COLONOSCOPY :- performed by Yo Shepard MD at [facility] ENDOSCOPY    ORTHOPEDIC SURGERY  2018    knee replacement     Social   Social History     Tobacco Use    Smoking status: Some Days    Smokeless tobacco: Never   Substance Use Topics    Alcohol use: Yes      Family History   Problem Relation Age of Onset    Stroke Mother     Cancer Daughter         colon      Allergies   Allergen Reactions    Ace Inhibitors Cough     Other reaction(s): cough & headache      Prior to Admission Medications   Prescriptions Last Dose Informant Patient Reported? Taking?   amLODIPine (NORVASC) 5 MG tablet 2024  Yes No   Sig: every 24 hours   aspirin 81 MG chewable tablet 6/3/2024  Yes Yes   Sig: Take 1 tablet by mouth daily   atorvastatin (LIPITOR) 20 MG tablet 2024  Yes No   Si tablet every 24 hours   buPROPion (WELLBUTRIN XL) 150 MG extended release tablet 2024  Yes Yes   Sig: Take 1 tablet by mouth every morning   citalopram (CELEXA) 40 MG tablet 2024  Yes No   Sig: Take 0.5 tablets by mouth daily   losartan (COZAAR) 50 MG tablet 2024  Yes No   Sig: Take 1 tablet by mouth daily   metFORMIN (GLUCOPHAGE) 1000 MG tablet 2024  Yes No   Si tablet  in the morning and 1 tablet in the evening.      Facility-Administered Medications: None       PHYSICAL EXAM:  The 
no

## 2024-06-04 NOTE — OP NOTE
[facility]  [facility]  VINICIUS Scott Jr, MD  [phone]      2024    Colonoscopy Procedure Note  Ludivina Jackson  :  1950  Angel Medical Record Number: [id number]    Indications:   Personal history of colon polyps  PCP:  Cass Scott PA-C  Anesthesia/Sedation: See Anesthesia Record  Endoscopist:  Dr. VINICIUS Scott Jr  Complications:  None  Estimated Blood Loss:  None    Surgical assistant: Circulator: Jayda Hall RN  Endoscopy Technician: Marie Toro     Implants none unless otherwise specified.     Permit:  The indications, risks, benefits and alternatives were reviewed with the patient or their decision maker who was provided an opportunity to ask questions and all questions were answered.  The specific risks of colonoscopy with conscious sedation were reviewed, including but not limited to anesthetic complication, bleeding, adverse drug reaction, missed lesion, infection, IV site reactions, and intestinal perforation which would lead to the need for surgical repair.  Alternatives to colonoscopy including radiographic imaging, observation without testing, or laboratory testing were reviewed including the limitations of those alternatives.  After considering the options and having all their questions answered, the patient or their decision maker provided both verbal and written consent to proceed.        Procedure in Detail:  After obtaining informed consent, positioning of the patient in the left lateral decubitus position, and conduction of a pre-procedure pause or \"time out\" the endoscope was introduced into the anus and advanced to the cecum, which was identified by the ileocecal valve and appendiceal orifice.  The quality of the colonic preparation was good.  A careful inspection was made as the colonoscope was withdrawn, findings and interventions are described

## 2024-06-04 NOTE — ANESTHESIA POSTPROCEDURE EVALUATION
Department of Anesthesiology  Postprocedure Note    Patient: Ludivina Jackson  MRN: [id number]  YOB: 1950  Date of evaluation: 6/4/2024    Procedure Summary       Date: 06/04/24 Room / Location: [facility] ENDO  / [facility] ENDOSCOPY    Anesthesia Start: 1051 Anesthesia Stop: 1111    Procedure: COLONOSCOPY (Lower GI Region) Diagnosis:       Family history of malignant neoplasm of gastrointestinal tract      Personal history of colonic polyps      (Family history of malignant neoplasm of gastrointestinal tract [Z80.0])      (Personal history of colonic polyps [Z86.010])    Surgeons: Kyleigh Scott MD Responsible Provider: Dameon Sarmiento MD    Anesthesia Type: MAC ASA Status: 2            Anesthesia Type: MAC    Josefina Phase I: Josefina Score: 10    Josefina Phase II: Josefina Score: 10    Anesthesia Post Evaluation    Patient location during evaluation: PACU  Patient participation: complete - patient participated  Level of consciousness: awake  Pain score: 0  Airway patency: patent  Nausea & Vomiting: no nausea  Cardiovascular status: blood pressure returned to baseline and hemodynamically stable  Respiratory status: acceptable  Hydration status: stable  Pain management: adequate and satisfactory to patient    No notable events documented.

## 2024-06-04 NOTE — ANESTHESIA PRE PROCEDURE
Department of Anesthesiology  Preprocedure Note       Name:  Ludivina Jackson   Age:  74 y.o.  :  1950                                          MRN:  [id number]         Date:  2024      Surgeon: Surgeon(s):  Kyleigh Scott MD    Procedure: Procedure(s):  COLONOSCOPY    Medications prior to admission:   Prior to Admission medications    Medication Sig Start Date End Date Taking? Authorizing Provider   buPROPion (WELLBUTRIN XL) 150 MG extended release tablet Take 1 tablet by mouth every morning 17  Yes Angel Luna MD   aspirin 81 MG chewable tablet Take 1 tablet by mouth daily   Yes Angel Luna MD   amLODIPine (NORVASC) 5 MG tablet every 24 hours    Angel Luna MD   atorvastatin (LIPITOR) 20 MG tablet 1 tablet every 24 hours    Angel Luna MD   losartan (COZAAR) 50 MG tablet Take 1 tablet by mouth daily    Angel Luna MD   citalopram (CELEXA) 40 MG tablet Take 0.5 tablets by mouth daily    Angel Luna MD   metFORMIN (GLUCOPHAGE) 1000 MG tablet 1 tablet  in the morning and 1 tablet in the evening.    ProviderAngel MD       Current medications:    Current Facility-Administered Medications   Medication Dose Route Frequency Provider Last Rate Last Admin   • 0.9 % sodium chloride infusion   IntraVENous Continuous Kyleigh Scott MD       • sodium chloride flush 0.9 % injection 5-40 mL  5-40 mL IntraVENous 2 times per day Kyleigh Scott MD       • sodium chloride flush 0.9 % injection 5-40 mL  5-40 mL IntraVENous PRN Kyleigh Scott MD       • 0.9 % sodium chloride infusion  25 mL IntraVENous PRN Kyleigh Scott MD           Allergies:    Allergies   Allergen Reactions   • Ace Inhibitors Cough     Other reaction(s): cough & headache       Problem List:  There is no problem list on file for this patient.      Past Medical History:        Diagnosis Date   • Diabetes (HCC)    • Hypertension        Past Surgical History:       No

## 2024-06-04 NOTE — DISCHARGE INSTRUCTIONS
[facility]  [facility]  VINICIUS Elizabeth Jr, MD  [phone]      June 4, 2024    Ludivina Jackson  YOB: 1950    COLONOSCOPY DISCHARGE INSTRUCTIONS    If there is redness at IV site you should apply warm compress to area.  If redness or soreness persist contact Dr. Elizabeth's office or your primary care doctor.    There may be a slight amount of blood passed from the rectum.  Gaseous discomfort may develop, but walking, belching will help relieve this.  You may not operate a vehicle for 12 hours  You may not operate machinery or dangerous appliances for rest of today  You may not drink alcoholic beverages for 12 hours  Avoid making any critical decisions for 24 hours    DIET:  You may resume your normal diet, but some patients find that heavy or large meals may lead to indigestion or vomiting.  I suggest a light meal as first food intake.    MEDICATIONS:  The use of some over-the-counter pain medication may lead to bleeding after colon biopsies or polyp removal.  Tylenol (also called acetaminophen) is safe to take even if you have had colonoscopy with polyp removal.  Based on the procedure you had today you may safely take aspirin or aspirin-like products for the next seven (7) days.  Remember that Tylenol (also called acetaminophen) is safe to take after colonoscopy even if you have had biopsies or polyps removed.    ACTIVITY:  You may resume your normal household activities, but it is recommended that you spend the remainder of the day resting -  avoid any strenuous activity.    CALL DR. ELIZABETH'S OFFICE IF:  Increasing pain, nausea, vomiting  Abdominal distension (swelling)  Significant new or increased bleeding (oral or rectal)  Fever/Chills  Chest pain/shortness of breath                       Additional instructions:   Impression:  Sigmoid colon polyps x 3, removed  Diverticulosis  Internal

## 2024-06-04 NOTE — PROGRESS NOTES

## (undated) DEVICE — BAG BELONG PT PERS CLEAR HANDL

## (undated) DEVICE — NEEDLE HYPO 18GA L1.5IN PNK S STL HUB POLYPR SHLD REG BVL

## (undated) DEVICE — 1200 GUARD II KIT W/5MM TUBE W/O VAC TUBE: Brand: GUARDIAN

## (undated) DEVICE — SET EXTN TBNG L BOR 4 W STPCOCK ST 32IN PRIMING VOL 6ML

## (undated) DEVICE — WRISTBAND ID AD W1XL11.5IN RED POLY ALRG PREPRINTED PERM

## (undated) DEVICE — TRAP SURG QUAD PARABOLA SLOT DSGN SFTY SCRN TRAPEASE

## (undated) DEVICE — NEONATAL-ADULT SPO2 SENSOR: Brand: NELLCOR

## (undated) DEVICE — CANN NASAL O2 CAPNOGRAPHY AD -- FILTERLINE

## (undated) DEVICE — CONTAINER SPEC 20 ML LID NEUT BUFF FORMALIN 10 % POLYPR STS

## (undated) DEVICE — CATH IV AUTOGRD BC BLU 22GA 25 -- INSYTE

## (undated) DEVICE — FORCEPS BX L240CM JAW DIA2.8MM L CAP W/ NDL MIC MESH TOOTH

## (undated) DEVICE — AIRLIFE™ U/CONNECT-IT OXYGEN TUBING 7 FEET (2.1 M) CRUSH-RESISTANT OXYGEN TUBING, VINYL TIPPED: Brand: AIRLIFE™

## (undated) DEVICE — SNARE ENDOSCP M L240CM W27MM SHTH DIA2.4MM CHN 2.8MM OVL

## (undated) DEVICE — Z DISCONTINUED USE 2751540 TUBING IRRIG L10IN DISP PMP ENDOGATOR

## (undated) DEVICE — BAG SPEC BIOHZD LF 2MIL 6X10IN -- CONVERT TO ITEM 357326

## (undated) DEVICE — SNARE ENDOSCP AD L240CM LOOP W10MM SHTH DIA2.4MM RND INSUL

## (undated) DEVICE — KENDALL RADIOLUCENT FOAM MONITORING ELECTRODE -RECTANGULAR SHAPE: Brand: KENDALL

## (undated) DEVICE — SOLIDIFIER FLUID 3000 CC ABSORB

## (undated) DEVICE — QUILTED PREMIUM COMFORT UNDERPAD,EXTRA HEAVY: Brand: WINGS

## (undated) DEVICE — BW-412T DISP COMBO CLEANING BRUSH: Brand: SINGLE USE COMBINATION CLEANING BRUSH

## (undated) DEVICE — BASKET SPEC RETRV L230CM W22MM ODSEC2.5MM 3 DIM NET ROT

## (undated) DEVICE — CUFF BLD PRSS AD SM SZ 10 FOR 20-26CM LIMB VLY SFT W/O TUBE

## (undated) DEVICE — CONNECTOR TBNG AUX H2O JET DISP FOR OLY 160/180 SER

## (undated) DEVICE — SYRINGE MED 20ML STD CLR PLAS LUERLOCK TIP N CTRL DISP

## (undated) DEVICE — ENDO CARRY-ON PROCEDURE KIT INCLUDES ENZYMATIC SPONGE, GAUZE, BIOHAZARD LABEL, TRAY, LUBRICANT, DIRTY SCOPE LABEL, WATER LABEL, TRAY, DRAWSTRING PAD, AND DEFENDO 4-PIECE KIT.: Brand: ENDO CARRY-ON PROCEDURE KIT

## (undated) DEVICE — KIT IV STRT W CHLORAPREP PD 1ML

## (undated) DEVICE — SET ADMIN 16ML TBNG L100IN 2 Y INJ SITE IV PIGGY BK DISP